# Patient Record
Sex: FEMALE | Race: BLACK OR AFRICAN AMERICAN | URBAN - METROPOLITAN AREA
[De-identification: names, ages, dates, MRNs, and addresses within clinical notes are randomized per-mention and may not be internally consistent; named-entity substitution may affect disease eponyms.]

---

## 2017-05-23 ENCOUNTER — TELEPHONE (OUTPATIENT)
Dept: INTERNAL MEDICINE CLINIC | Facility: CLINIC | Age: 22
End: 2017-05-23

## 2017-05-23 ENCOUNTER — OFFICE VISIT (OUTPATIENT)
Dept: INTERNAL MEDICINE CLINIC | Facility: CLINIC | Age: 22
End: 2017-05-23

## 2017-05-23 VITALS
WEIGHT: 176 LBS | BODY MASS INDEX: 23.84 KG/M2 | DIASTOLIC BLOOD PRESSURE: 75 MMHG | SYSTOLIC BLOOD PRESSURE: 108 MMHG | OXYGEN SATURATION: 97 % | HEIGHT: 72 IN | RESPIRATION RATE: 18 BRPM | TEMPERATURE: 97.1 F | HEART RATE: 59 BPM

## 2017-05-23 DIAGNOSIS — B37.9 YEAST INFECTION: ICD-10-CM

## 2017-05-23 DIAGNOSIS — Z12.4 SCREENING FOR CERVICAL CANCER: ICD-10-CM

## 2017-05-23 DIAGNOSIS — Z00.00 ANNUAL PHYSICAL EXAM: Primary | ICD-10-CM

## 2017-05-23 DIAGNOSIS — Z30.9 ENCOUNTER FOR CONTRACEPTIVE MANAGEMENT, UNSPECIFIED TYPE: ICD-10-CM

## 2017-05-23 DIAGNOSIS — F51.01 PRIMARY INSOMNIA: ICD-10-CM

## 2017-05-23 DIAGNOSIS — Z30.41 ENCOUNTER FOR SURVEILLANCE OF CONTRACEPTIVE PILLS: Primary | ICD-10-CM

## 2017-05-23 DIAGNOSIS — Z11.3 SCREEN FOR STD (SEXUALLY TRANSMITTED DISEASE): ICD-10-CM

## 2017-05-23 RX ORDER — FLUCONAZOLE 150 MG/1
150 TABLET ORAL
Qty: 2 TAB | Refills: 0 | Status: SHIPPED | OUTPATIENT
Start: 2017-05-23 | End: 2017-05-31

## 2017-05-23 NOTE — PATIENT INSTRUCTIONS
Consider using OTC hydrocortisone cream and Chafing gel (monistat makes one) for groin area. Melatonin with dinner nightly up to 15 mg/night.

## 2017-05-23 NOTE — PROGRESS NOTES
HISTORY OF PRESENT ILLNESS  Ani Henderson is a 24 y.o. female. Tumeric pill  HPI  1) CE and Pap - fasting for labs      2) Pt requests contraception. Has been off of pill since November - stopped taking it because she didn't need it for contraception at the time. Pt notes signs of ovulatory cervical mucus mid cycle. Using pull out method for contraception. Has been with current partner for 2-3 months. LMP April 30th. Periods 3-4 days in length. 3) Vaginal Discharge and itching x 1 week. Has not tried anything OTC. Review of Systems   Constitutional: Negative for fever and weight loss. HENT: Negative for congestion, hearing loss and sore throat. Eyes: Negative for blurred vision. Respiratory: Negative for cough and shortness of breath. Cardiovascular: Negative for chest pain, palpitations and leg swelling. Gastrointestinal: Positive for constipation. Negative for abdominal pain, blood in stool, diarrhea, heartburn, melena, nausea and vomiting. Constipation is improved with hydration   Genitourinary: Negative for dysuria, frequency, hematuria and urgency. Musculoskeletal: Negative for back pain, joint pain and neck pain. Neurological: Negative for dizziness, seizures, loss of consciousness, weakness and headaches. Endo/Heme/Allergies: Negative for environmental allergies. Psychiatric/Behavioral: Negative for depression and substance abuse. The patient is nervous/anxious and has insomnia. Insomnia and Anxiety frequently with school stress - feels that she is handling this reasonably well without needing treatment. Physical Exam   Constitutional: She is oriented to person, place, and time. She appears well-developed and well-nourished. No distress. HENT:   Head: Normocephalic and atraumatic.    Right Ear: External ear normal.   Left Ear: External ear normal.   Nose: Nose normal.   Mouth/Throat: Oropharynx is clear and moist.   Eyes: Conjunctivae are normal.   Neck: Neck supple. No JVD present. No thyromegaly present. Cardiovascular: Normal rate, regular rhythm and normal heart sounds. Pulmonary/Chest: Effort normal and breath sounds normal. No respiratory distress. Breast symmetrical B without mass, tenderness, or discharge. No lymph enlargement in B underarms. Abdominal: Soft. Bowel sounds are normal. She exhibits no distension and no mass. There is no tenderness. There is no rebound and no guarding. Genitourinary: Uterus normal. Vaginal discharge found. Genitourinary Comments: Copious white, thick discharge noted. Cervix appears normal - not inflamed. No CMT. Musculoskeletal: She exhibits no edema. Lymphadenopathy:     She has no cervical adenopathy. Neurological: She is alert and oriented to person, place, and time. Skin: Skin is warm and dry. Psychiatric: She has a normal mood and affect. Her behavior is normal. Judgment and thought content normal.   Nursing note and vitals reviewed. ASSESSMENT and PLAN    ICD-10-CM ICD-9-CM    1. Annual physical exam Z00.00 V70.0 CBC WITH AUTOMATED DIFF      METABOLIC PANEL, COMPREHENSIVE      LIPID PANEL      THYROID CASCADE PROFILE   2. Yeast infection B37.9 112.9 fluconazole (DIFLUCAN) 150 mg tablet   3. Encounter for contraceptive management, unspecified type Z30.9 V25.9 Deferred to Dr. Cheyanne Rincon for Baptism reasons. 4. Screen for STD (sexually transmitted disease) Z11.3 V74.5 HEPATITIS SCREENING PANEL      HIV 1/2 AG/AB, 4TH GENERATION,W RFLX CONFIRM      T PALLIDUM SCREEN W/REFLEX      PAP LB, CT-NG TV RFX HPV NWR(553435, [id number])   5. Screening for cervical cancer Z12.4 V76.2 PAP LB, CT-NG TV RFX HPV OKA(706779, 667433)   6. Primary insomnia F51.01 307.42 Melatonin OTC PRN. Encouraged pt to stop smoking marijuana.

## 2017-05-23 NOTE — MR AVS SNAPSHOT
Visit Information Date & Time Provider Department Dept. Phone Encounter #  
 5/23/2017  2:15 PM Justine Pelletier PA-C [facility] Internal Medicine [phone] [id number] Follow-up Instructions Return in about 1 year (around 5/23/2018) for Physical when convenient. Upcoming Health Maintenance Date Due  
 HPV AGE 9Y-34Y (1 of 3 - Female 3 Dose Series) 7/18/2006 DTaP/Tdap/Td series (1 - Tdap) 7/18/2016 PAP AKA CERVICAL CYTOLOGY 7/18/2016 INFLUENZA AGE 9 TO ADULT 8/1/2017 Allergies as of 5/23/2017  Review Complete On: 5/23/2017 By: Justine Pelletier PA-C Severity Noted Reaction Type Reactions Pcn [Penicillins]  12/30/2015    Hives Current Immunizations  Reviewed on 12/30/2015 Name Date Influenza Vaccine 12/16/2015 Not reviewed this visit You Were Diagnosed With   
  
 Codes Comments Annual physical exam    -  Primary ICD-10-CM: Z00.00 ICD-9-CM: V70.0 Yeast infection     ICD-10-CM: B37.9 ICD-9-CM: 112.9 Encounter for contraceptive management, unspecified type     ICD-10-CM: Z30.9 ICD-9-CM: V25.9 Screen for STD (sexually transmitted disease)     ICD-10-CM: Z11.3 ICD-9-CM: V74.5 Screening for cervical cancer     ICD-10-CM: Z12.4 ICD-9-CM: V76.2 Primary insomnia     ICD-10-CM: F51.01 
ICD-9-CM: 307.42 Vitals BP Pulse Temp Resp Height(growth percentile) Weight(growth percentile) 108/75 (BP 1 Location: Left arm, BP Patient Position: Sitting) (!) 59 97.1 °F (36.2 °C) (Oral) 18 5' 11.6\" (1.819 m) 176 lb (79.8 kg) LMP SpO2 BMI OB Status Smoking Status 04/30/2017 97% 24.14 kg/m2 Having regular periods Never Smoker Vitals History BMI and BSA Data Body Mass Index Body Surface Area  
 24.14 kg/m 2 2.01 m 2 Preferred Pharmacy Pharmacy Name Phone [facility] [phone] Your Updated Medication List  
  
   
 This list is accurate as of: 5/23/17  3:14 PM.  Always use your most recent med list.  
  
  
  
  
 Biotin 2,500 mcg Cap Take  by mouth. cholecalciferol 1,000 unit Cap Commonly known as:  VITAMIN D3 Take  by mouth. fluconazole 150 mg tablet Commonly known as:  DIFLUCAN Take 1 Tab by mouth every seven (7) days for 2 doses. Take one by mouth weekly. LOMEDIA 24 FE 1 mg-20 mcg (24)/75 mg (4) Tab Generic drug:  norethindrone-e estradiol-iron TAKE 1 TABLET BY MOUTH EVERY DAY  
  
 multivitamin tablet Commonly known as:  ONE A DAY Take 1 Tab by mouth daily. OTHER(NON-FORMULARY) Indications: Tumeric  
  
 vitamin E acetate 400 unit Cap capsule Take  by mouth daily. Prescriptions Sent to Pharmacy Refills  
 fluconazole (DIFLUCAN) 150 mg tablet 0 Sig: Take 1 Tab by mouth every seven (7) days for 2 doses. Take one by mouth weekly. Class: Normal  
 Pharmacy: [address] [place] [address] #: [phone] Route: Oral  
  
We Performed the Following CBC WITH AUTOMATED DIFF [97819 CPT(R)] HEPATITIS SCREENING PANEL [[id number] Custom] HIV 1/2 AG/AB, 4TH GENERATION,W RFLX CONFIRM [[id number] Custom] LIPID PANEL [34745 CPT(R)] METABOLIC PANEL, COMPREHENSIVE [67834 CPT(R)] PAP LB, CT-NG TV RFX HPV [id number], [id number]) [[id number] Custom] T PALLIDUM SCREEN W/REFLEX [[id number] Custom] THYROID CASCADE PROFILE [[id number] Custom] Follow-up Instructions Return in about 1 year (around 5/23/2018) for Physical when convenient. Patient Instructions Consider using OTC hydrocortisone cream and Chafing gel (monistat makes one) for groin area. Melatonin with dinner nightly up to 15 mg/night. Introducing [facility] & HEALTH SERVICES! [place] [employer] introduces LOC[employer]ALL patient portal. Now you can access parts of your medical record, email your doctor's office, and request medication refills online. 1. In your internet browser, go to [email or web address] 2. Click on the First Time User? Click Here link in the Sign In box. You will see the New Member Sign Up page. 3. Enter your SVXR Access Code exactly as it appears below. You will not need to use this code after youve completed the sign-up process. If you do not sign up before the expiration date, you must request a new code. · SV[employer] Access Code: [email or web address] Expires: 8/21/2017  2:22 PM 
 
4. Enter the last four digits of your Social Security Number (xxxx) and Date of Birth (mm/dd/yyyy) as indicated and click Submit. You will be taken to the next sign-up page. 5. Create a [employer] ID. This will be your SVXR login ID and cannot be changed, so think of one that is secure and easy to remember. 6. Create a SVXR password. You can change your password at any time. 7. Enter your Password Reset Question and Answer. This can be used at a later time if you forget your password. 8. Enter your e-mail address. You will receive e-mail notification when new information is available in [address] 9. Click Sign Up. You can now view and download portions of your medical record. 10. Click the Download Summary menu link to download a portable copy of your medical information. If you have questions, please visit the Frequently Asked Questions section of the SVXR website. Remember, SVXR is NOT to be used for urgent needs. For medical emergencies, dial 911. Now available from your iPhone and Android! Please provide this summary of care documentation to your next provider. Your primary care clinician is listed as Skip Mirza. If you have any questions after today's visit, please call [phone].

## 2017-05-23 NOTE — PROGRESS NOTES
Room 7    Chief Complaint   Patient presents with    Complete Physical     Pap smear     1. Have you been to the ER, urgent care clinic since your last visit? Hospitalized since your last visit? No    2. Have you seen or consulted any other health care providers outside of the [facility] since your last visit? Include any pap smears or colon screening.  No     Health Maintenance Due   Topic Date Due    HPV AGE 9Y-34Y (1 of 3 - Female 3 Dose Series) 07/18/2006    DTaP/Tdap/Td series (1 - Tdap) 07/18/2016    PAP AKA CERVICAL CYTOLOGY  07/18/2016

## 2017-05-23 NOTE — TELEPHONE ENCOUNTER
Dr Adela Forde - this patient came in to see me for a physical with pap and wants to restart her birth control pills for contraception. She has been off of birth control pills since November because she was not sexually active until recently. She is currently using the \"pull out method\" regularly for contraception and notes understanding that this is not an effective means of contraception. She has been in a monogamous relationship with 1 male partner x 2 months. She understands the risk of blood clots/stroke with OCPs, and she denies tobacco use/FH clotting d/o. I did a pap and STD screen today. She is aware that you might want her to come in for a visit to discuss contraception, but requests that I ask you if you will restart her birth control pill Rx.

## 2017-05-25 LAB
ALBUMIN SERPL-MCNC: 4.7 G/DL (ref 3.5–5.5)
ALBUMIN/GLOB SERPL: 1.6 {RATIO} (ref 1.2–2.2)
ALP SERPL-CCNC: 47 IU/L (ref 39–117)
ALT SERPL-CCNC: 30 IU/L (ref 0–32)
AST SERPL-CCNC: 62 IU/L (ref 0–40)
BASOPHILS # BLD AUTO: 0 X10E3/UL (ref 0–0.2)
BASOPHILS NFR BLD AUTO: 0 %
BILIRUB SERPL-MCNC: 0.5 MG/DL (ref 0–1.2)
BUN SERPL-MCNC: 16 MG/DL (ref 6–20)
BUN/CREAT SERPL: 17 (ref 9–23)
CALCIUM SERPL-MCNC: 10 MG/DL (ref 8.7–10.2)
CHLORIDE SERPL-SCNC: 99 MMOL/L (ref 96–106)
CHOLEST SERPL-MCNC: 196 MG/DL (ref 100–199)
CO2 SERPL-SCNC: 23 MMOL/L (ref 18–29)
CREAT SERPL-MCNC: 0.94 MG/DL (ref 0.57–1)
EOSINOPHIL # BLD AUTO: 0.1 X10E3/UL (ref 0–0.4)
EOSINOPHIL NFR BLD AUTO: 1 %
ERYTHROCYTE [DISTWIDTH] IN BLOOD BY AUTOMATED COUNT: 14.3 % (ref 12.3–15.4)
GLOBULIN SER CALC-MCNC: 2.9 G/DL (ref 1.5–4.5)
GLUCOSE SERPL-MCNC: 77 MG/DL (ref 65–99)
HAV AB SER QL IA: NEGATIVE
HBV CORE AB SERPL QL IA: NEGATIVE
HCT VFR BLD AUTO: 42.9 % (ref 34–46.6)
HCV AB S/CO SERPL IA: <0.1 S/CO RATIO (ref 0–0.9)
HCV AB SERPL QL IA: NORMAL
HDLC SERPL-MCNC: 95 MG/DL
HGB BLD-MCNC: 13.9 G/DL (ref 11.1–15.9)
HIV 1+2 AB+HIV1 P24 AG SERPL QL IA: NON REACTIVE
IMM GRANULOCYTES # BLD: 0 X10E3/UL (ref 0–0.1)
IMM GRANULOCYTES NFR BLD: 0 %
LDLC SERPL CALC-MCNC: 93 MG/DL (ref 0–99)
LYMPHOCYTES # BLD AUTO: 2.9 X10E3/UL (ref 0.7–3.1)
LYMPHOCYTES NFR BLD AUTO: 55 %
MCH RBC QN AUTO: 29.8 PG (ref 26.6–33)
MCHC RBC AUTO-ENTMCNC: 32.4 G/DL (ref 31.5–35.7)
MCV RBC AUTO: 92 FL (ref 79–97)
MONOCYTES # BLD AUTO: 0.3 X10E3/UL (ref 0.1–0.9)
MONOCYTES NFR BLD AUTO: 6 %
NEUTROPHILS # BLD AUTO: 2 X10E3/UL (ref 1.4–7)
NEUTROPHILS NFR BLD AUTO: 38 %
PLATELET # BLD AUTO: 273 X10E3/UL (ref 150–379)
POTASSIUM SERPL-SCNC: 4.5 MMOL/L (ref 3.5–5.2)
PROT SERPL-MCNC: 7.6 G/DL (ref 6–8.5)
RBC # BLD AUTO: 4.66 X10E6/UL (ref 3.77–5.28)
SODIUM SERPL-SCNC: 140 MMOL/L (ref 134–144)
T PALLIDUM AB SER QL IA: NEGATIVE
TRIGL SERPL-MCNC: 38 MG/DL (ref 0–149)
TSH SERPL DL<=0.005 MIU/L-ACNC: 1.8 UIU/ML (ref 0.45–4.5)
VLDLC SERPL CALC-MCNC: 8 MG/DL (ref 5–40)
WBC # BLD AUTO: 5.3 X10E3/UL (ref 3.4–10.8)

## 2017-05-25 NOTE — TELEPHONE ENCOUNTER
Please inform pt that Dr. Natalie Coates agrees to refill pt's birth control if she comes in for a lab only pregnancy test. Have pt request that the pharmacy send a refill request to Dr. Natalie Coates.

## 2017-05-27 LAB
C TRACH RRNA CVX QL NAA+PROBE: NEGATIVE
CYTOLOGIST CVX/VAG CYTO: NORMAL
DX ICD CODE: NORMAL
DX ICD CODE: NORMAL
LABCORP, 190119: NORMAL
Lab: NORMAL
N GONORRHOEA RRNA CVX QL NAA+PROBE: NEGATIVE
OTHER STN SPEC: NORMAL
PATH REPORT.FINAL DX SPEC: NORMAL
STAT OF ADQ CVX/VAG CYTO-IMP: NORMAL
T VAGINALIS RRNA SPEC QL NAA+PROBE: NEGATIVE

## 2017-06-01 PROBLEM — R74.8 ELEVATED LIVER ENZYMES: Status: ACTIVE | Noted: 2017-06-01

## 2017-06-07 ENCOUNTER — LAB ONLY (OUTPATIENT)
Dept: INTERNAL MEDICINE CLINIC | Facility: CLINIC | Age: 22
End: 2017-06-07

## 2017-06-07 DIAGNOSIS — N91.2 AMENORRHEA: Primary | ICD-10-CM

## 2017-06-07 LAB
HCG URINE, QL. (POC): NEGATIVE
VALID INTERNAL CONTROL?: YES

## 2017-07-05 ENCOUNTER — OFFICE VISIT (OUTPATIENT)
Dept: INTERNAL MEDICINE CLINIC | Facility: CLINIC | Age: 22
End: 2017-07-05

## 2017-07-05 VITALS
TEMPERATURE: 97.2 F | HEIGHT: 71 IN | BODY MASS INDEX: 24.64 KG/M2 | HEART RATE: 69 BPM | DIASTOLIC BLOOD PRESSURE: 72 MMHG | SYSTOLIC BLOOD PRESSURE: 109 MMHG | RESPIRATION RATE: 18 BRPM | WEIGHT: 176 LBS

## 2017-07-05 DIAGNOSIS — R74.8 ELEVATED LIVER ENZYMES: Primary | ICD-10-CM

## 2017-07-05 DIAGNOSIS — Z30.41 ENCOUNTER FOR SURVEILLANCE OF CONTRACEPTIVE PILLS: ICD-10-CM

## 2017-07-05 LAB
HCG URINE, QL. (POC): NEGATIVE
VALID INTERNAL CONTROL?: YES

## 2017-07-05 NOTE — PROGRESS NOTES
Chief Complaint   Patient presents with    Medication Refill     birth control     1. Have you been to the ER, urgent care clinic since your last visit? Hospitalized since your last visit? No    2. Have you seen or consulted any other health care providers outside of the [address] [place] [address] since your last visit? Include any pap smears or colon screening.  No

## 2017-07-05 NOTE — PROGRESS NOTES
Subjective: Lis Mason is a 24 y.o. female who presents today for follow up for birth control    Contraception management: She stopped taking her birht control in November, she broke up with her boyfriend and moved. She is ready to restart this. She states she is not currently sexually active. Menses is 3-4 weeks apart and last 3 days. The patient denies smoking cigarettes, is not currently pregnant, does not have a history of blood clots or stroke, has never had breast cancer, does not have irregular or heavy periods, does not have liver or heart disease, and does not have a history of migraine headaches. Elevated AST: elevation noted in May, will recheck values today. She denies abdominal pain, back pain,     Patient Active Problem List    Diagnosis Date Noted    Elevated liver enzymes 06/01/2017    Primary insomnia 05/23/2017    Acne 12/30/2015     Current Outpatient Prescriptions   Medication Sig Dispense Refill    OTHER,NON-FORMULARY, Indications: Tumeric      LOMEDIA 24 FE 1 mg-20 mcg (24)/75 mg (4) tab TAKE 1 TABLET BY MOUTH EVERY DAY 28 Tab 6    multivitamin (ONE A DAY) tablet Take 1 Tab by mouth daily. Allergies   Allergen Reactions    Pcn [Penicillins] Hives     No past medical history on file. Family History   Problem Relation Age of Onset    Hypertension Mother     Hypertension Father      Social History   Substance Use Topics    Smoking status: Never Smoker    Smokeless tobacco: Never Used    Alcohol use Yes      Comment: 1-2x/month 3 drinks        Review of Systems    A comprehensive review of systems was negative except for that written in the HPI.      Objective:     Visit Vitals    /72    Pulse 69    Temp 97.2 °F (36.2 °C) (Oral)    Resp 18    Ht 5' 11\" (1.803 m)    Wt 176 lb (79.8 kg)    LMP 06/30/2017 (Exact Date)    BMI 24.55 kg/m2     General appearance: alert, cooperative, no distress, appears stated age  Head: Normocephalic, without obvious abnormality, atraumatic  Eyes: negative  Back: symmetric, no curvature. ROM normal. No CVA tenderness. Lungs: clear to auscultation bilaterally  Heart: regular rate and rhythm, S1, S2 normal, no murmur, click, rub or gallop  Extremities: extremities normal, atraumatic, no cyanosis or edema  Pulses: 2+ and symmetric  Neurologic: Alert and oriented X 3, normal strength and tone. Normal coordination and gait  Nursing note and vitals reviewed  Assessment/Plan:       ICD-10-CM ICD-9-CM    1. Elevated liver enzymes R74.8 790.5 HEPATIC FUNCTION PANEL   2. Encounter for surveillance of contraceptive pills Z30.41 V25.41 AMB POC URINE PREGNANCY TEST, VISUAL COLOR COMPARISON      norethindrone-e estradiol-iron (LOMEDIA 24 FE) 1 mg-20 mcg (24)/75 mg (4) tab    Upreg negative. LFTs drawn and pending. Follow-up Disposition:  Return for As needed. Advised her to call back or return to office if symptoms worsen/change/persist.  Discussed expected course/resolution/complications of diagnosis in detail with patient. Medication risks/benefits/costs/interactions/alternatives discussed with patient. She was given an after visit summary which includes diagnoses, current medications, & vitals. She expressed understanding with the diagnosis and plan.

## 2017-07-05 NOTE — MR AVS SNAPSHOT
Visit Information Date & Time Provider Department Dept. Phone Encounter #  
 7/5/2017 10:30 AM Chris Byrd NP [facility] Internal Medicine [phone] [id number] Follow-up Instructions Return for As needed. Upcoming Health Maintenance Date Due  
 HPV AGE 9Y-34Y (1 of 3 - Female 3 Dose Series) 7/18/2006 DTaP/Tdap/Td series (1 - Tdap) 7/18/2016 INFLUENZA AGE 9 TO ADULT 8/1/2017 PAP AKA CERVICAL CYTOLOGY 5/23/2020 Allergies as of 7/5/2017  Review Complete On: 7/5/2017 By: Chris Byrd NP Severity Noted Reaction Type Reactions Pcn [Penicillins]  12/30/2015    Hives Current Immunizations  Reviewed on 12/30/2015 Name Date Influenza Vaccine 12/16/2015 Not reviewed this visit You Were Diagnosed With   
  
 Codes Comments Elevated liver enzymes    -  Primary ICD-10-CM: R74.8 ICD-9-CM: 790.5 Encounter for surveillance of contraceptive pills     ICD-10-CM: Z30.41 ICD-9-CM: V25.41 Vitals BP Pulse Temp Resp Height(growth percentile) Weight(growth percentile) 109/72 69 97.2 °F (36.2 °C) (Oral) 18 5' 11\" (1.803 m) 176 lb (79.8 kg) LMP BMI OB Status Smoking Status 06/30/2017 (Exact Date) 24.55 kg/m2 Having regular periods Never Smoker Vitals History BMI and BSA Data Body Mass Index Body Surface Area 24.55 kg/m 2 2 m 2 Preferred Pharmacy Pharmacy Name Phone [phone] [phone] Your Updated Medication List  
  
   
This list is accurate as of: 7/5/17 10:58 AM.  Always use your most recent med list.  
  
  
  
  
 multivitamin tablet Commonly known as:  ONE A DAY Take 1 Tab by mouth daily. norethindrone-e estradiol-iron 1 mg-20 mcg (24)/75 mg (4) Tab Commonly known as:  LOMEDIA 24 FE Take 1 Tab by mouth daily. OTHER(NON-FORMULARY) Indications: Tumeric Prescriptions Sent to Pharmacy Refills  
 norethindrone-e estradiol-iron (LOMEDIA 24 FE) 1 mg-20 mcg (24)/75 mg (4) tab 3 Sig: Take 1 Tab by mouth daily. Class: Normal  
 Pharmacy: [address] [place] [address] #: [phone] Route: Oral  
  
We Performed the Following AMB POC URINE PREGNANCY TEST, VISUAL COLOR COMPARISON [45759 CPT(R)] HEPATIC FUNCTION PANEL [85102 CPT(R)] Follow-up Instructions Return for As needed. Patient Instructions Learning About Birth Control: Combination Pills What are combination pills? Combination pills are used to prevent pregnancy. Most people call them \"the pill. \" Combination pills release a regular dose of two hormones, estrogen and progestin. They prevent pregnancy in a few ways. They thicken the mucus in the cervix. This makes it hard for sperm to travel into the uterus. And they thin the lining of the uterus. This makes it harder for a fertilized egg to attach to the uterus. The hormones also can stop the ovaries from releasing an egg each month (ovulation). You have to take a pill every day to prevent pregnancy. The packages for these pills are different. The most common one has 3 weeks of hormone pills and 1 week of sugar pills. The sugar pills don't contain any hormones. You have your period on that week. But other packs have no sugar pills. If you take hormone pills for the whole month, you will not get your period as often. Or you may not get it at all. How well do they work? In the first year of use: · When combination pills are taken exactly as directed, fewer than 1 woman out of 100 has an unplanned pregnancy. · When pills are not taken exactly as directed, such as forgetting to take them sometimes, 9 women out of 100 have an unplanned pregnancy.  
Be sure to tell your doctor about any health problems you have or medicines you take. He or she can help you choose the birth control method that is right for you. What are the advantages of combination pills? · These pills work better than barrier methods. Barrier methods include condoms and diaphragms. · They may reduce acne and heavy bleeding. They may also reduce cramping and other symptoms of PMS (premenstrual syndrome). · The pills let you control your periods. You can have periods every month or every few months. Or you can choose not to have them at all. · You don't have to interrupt sex to use the pills. What are the disadvantages of combination pills? · You have to take a pill at the same time every day to prevent pregnancy. · Combination pills don't protect against sexually transmitted infections (STIs), such as herpes or HIV/AIDS. If you aren't sure if your sex partner might have an STI, use a condom to protect against disease. · They may cause changes in your period. You may have little bleeding, skipped periods, or spotting. · They may cause mood changes, less interest in sex, or weight gain. · Combination pills contain estrogen. They may not be right for you if you have certain health problems. Where can you learn more? Go to http://miki-ishan.info/. Enter G699 in the search box to learn more about \"Learning About Birth Control: Combination Pills. \" Current as of: March 16, 2017 Content Version: 11.3 © 2477-6666 TrustYou. Care instructions adapted under license by [employer] (which disclaims liability or warranty for this information). If you have questions about a medical condition or this instruction, always ask your healthcare professional. [address] any warranty or liability for your use of this information. Introducing [facility] & HEALTH SERVICES!    
 [employer] introduces [employer] patient portal. Now you can access parts of your medical record, email your doctor's office, and request medication refills online. 1. In your internet browser, go to [email or web address] 2. Click on the First Time User? Click Here link in the Sign In box. You will see the New Member Sign Up page. 3. Enter your [employer] Access Code exactly as it appears below. You will not need to use this code after youve completed the sign-up process. If you do not sign up before the expiration date, you must request a new code. · [employer] Access Code: [email or web address] Expires: 8/21/2017  2:22 PM 
 
4. Enter the last four digits of your Social Security Number (xxxx) and Date of Birth (mm/dd/yyyy) as indicated and click Submit. You will be taken to the next sign-up page. 5. Create a [employer] ID. This will be your [employer] login ID and cannot be changed, so think of one that is secure and easy to remember. 6. Create a [employer] password. You can change your password at any time. 7. Enter your Password Reset Question and Answer. This can be used at a later time if you forget your password. 8. Enter your e-mail address. You will receive e-mail notification when new information is available in [address] 9. Click Sign Up. You can now view and download portions of your medical record. 10. Click the Download Summary menu link to download a portable copy of your medical information. If you have questions, please visit the Frequently Asked Questions section of the [employer] website. Remember, [employer] is NOT to be used for urgent needs. For medical emergencies, dial 911. Now available from your iPhone and Android! Please provide this summary of care documentation to your next provider. Your primary care clinician is listed as Cherie Berkowitz. If you have any questions after today's visit, please call [phone].

## 2017-07-05 NOTE — PATIENT INSTRUCTIONS
Learning About Birth Control: Combination Pills  What are combination pills? Combination pills are used to prevent pregnancy. Most people call them \"the pill. \"  Combination pills release a regular dose of two hormones, estrogen and progestin. They prevent pregnancy in a few ways. They thicken the mucus in the cervix. This makes it hard for sperm to travel into the uterus. And they thin the lining of the uterus. This makes it harder for a fertilized egg to attach to the uterus. The hormones also can stop the ovaries from releasing an egg each month (ovulation). You have to take a pill every day to prevent pregnancy. The packages for these pills are different. The most common one has 3 weeks of hormone pills and 1 week of sugar pills. The sugar pills don't contain any hormones. You have your period on that week. But other packs have no sugar pills. If you take hormone pills for the whole month, you will not get your period as often. Or you may not get it at all. How well do they work? In the first year of use:  · When combination pills are taken exactly as directed, fewer than 1 woman out of 100 has an unplanned pregnancy. · When pills are not taken exactly as directed, such as forgetting to take them sometimes, 9 women out of 100 have an unplanned pregnancy. Be sure to tell your doctor about any health problems you have or medicines you take. He or she can help you choose the birth control method that is right for you. What are the advantages of combination pills? · These pills work better than barrier methods. Barrier methods include condoms and diaphragms. · They may reduce acne and heavy bleeding. They may also reduce cramping and other symptoms of PMS (premenstrual syndrome). · The pills let you control your periods. You can have periods every month or every few months. Or you can choose not to have them at all. · You don't have to interrupt sex to use the pills.   What are the disadvantages of combination pills? · You have to take a pill at the same time every day to prevent pregnancy. · Combination pills don't protect against sexually transmitted infections (STIs), such as herpes or HIV/AIDS. If you aren't sure if your sex partner might have an STI, use a condom to protect against disease. · They may cause changes in your period. You may have little bleeding, skipped periods, or spotting. · They may cause mood changes, less interest in sex, or weight gain. · Combination pills contain estrogen. They may not be right for you if you have certain health problems. Where can you learn more? Go to http://miki-ishan.info/. Enter Q243 in the search box to learn more about \"Learning About Birth Control: Combination Pills. \"  Current as of: March 16, 2017  Content Version: 11.3  © 2805-3078 [employer]. Care instructions adapted under license by [email or web address] (which disclaims liability or warranty for this information). If you have questions about a medical condition or this instruction, always ask your healthcare professional. [address] any warranty or liability for your use of this information.

## 2017-07-06 LAB
ALBUMIN SERPL-MCNC: 4.5 G/DL (ref 3.5–5.5)
ALP SERPL-CCNC: 51 IU/L (ref 39–117)
ALT SERPL-CCNC: 18 IU/L (ref 0–32)
AST SERPL-CCNC: 36 IU/L (ref 0–40)
BILIRUB DIRECT SERPL-MCNC: 0.07 MG/DL (ref 0–0.4)
BILIRUB SERPL-MCNC: <0.2 MG/DL (ref 0–1.2)
PROT SERPL-MCNC: 7.6 G/DL (ref 6–8.5)

## 2017-10-02 DIAGNOSIS — Z30.41 ENCOUNTER FOR SURVEILLANCE OF CONTRACEPTIVE PILLS: ICD-10-CM

## 2017-10-06 ENCOUNTER — TELEPHONE (OUTPATIENT)
Dept: INTERNAL MEDICINE CLINIC | Facility: CLINIC | Age: 22
End: 2017-10-06

## 2017-10-06 NOTE — TELEPHONE ENCOUNTER
Pt called and stated she called and was wondering if her birth control had been refilled, could take 48 hours for a response and was wondering if this had been compelted

## 2017-10-09 DIAGNOSIS — Z30.41 ENCOUNTER FOR SURVEILLANCE OF CONTRACEPTIVE PILLS: Primary | ICD-10-CM

## 2017-10-09 RX ORDER — NORETHINDRONE ACETATE AND ETHINYL ESTRADIOL 1MG-20(21)
1 KIT ORAL DAILY
Qty: 3 PACKAGE | Refills: 3 | Status: SHIPPED | OUTPATIENT
Start: 2017-10-09

## 2017-10-09 NOTE — TELEPHONE ENCOUNTER
Spoke to patient who states she has a new insurance and pharmacy will not refill. Called and spoke to pharmacy who states that the new insurance will not cover the current birth control prescribed.

## 2017-10-09 NOTE — TELEPHONE ENCOUNTER
Patient notified that script was sent to pharmacy. If script not covered to check with insurance as to what is.   Patient verbalized agreement